# Patient Record
(demographics unavailable — no encounter records)

---

## 2024-12-18 NOTE — HISTORY OF PRESENT ILLNESS
[de-identified] : Initial visit:  Left Ankle Pain  Reason: Rolled ankle  Duration: 11/29/2024 Prior studies: x rays ordered  Symptoms stiff / swollen / Sensitive  Aggravating Fx:  Alleviating Fx: ankle brace  Pain level: 0/10 Pain medication: Advil  Medical Hx: none  Surgical Hx: N/A  Current Medication: none  Allergies: NKA

## 2024-12-18 NOTE — HISTORY OF PRESENT ILLNESS
[de-identified] : Initial visit:  Left Ankle Pain  Reason: Rolled ankle  Duration: 11/29/2024 Prior studies: x rays ordered  Symptoms stiff / swollen / Sensitive  Aggravating Fx:  Alleviating Fx: ankle brace  Pain level: 0/10 Pain medication: Advil  Medical Hx: none  Surgical Hx: N/A  Current Medication: none  Allergies: NKA